# Patient Record
Sex: FEMALE | Race: WHITE | Employment: OTHER | URBAN - METROPOLITAN AREA
[De-identification: names, ages, dates, MRNs, and addresses within clinical notes are randomized per-mention and may not be internally consistent; named-entity substitution may affect disease eponyms.]

---

## 2020-06-26 ENCOUNTER — HOSPITAL ENCOUNTER (EMERGENCY)
Age: 70
Discharge: HOME | End: 2020-06-26
Attending: EMERGENCY MEDICINE | Admitting: EMERGENCY MEDICINE
Payer: MEDICARE

## 2020-06-26 ENCOUNTER — APPOINTMENT (OUTPATIENT)
Dept: GENERAL RADIOLOGY | Age: 70
End: 2020-06-26
Attending: EMERGENCY MEDICINE
Payer: MEDICARE

## 2020-06-26 ENCOUNTER — APPOINTMENT (OUTPATIENT)
Dept: CT IMAGING | Age: 70
End: 2020-06-26
Attending: PHYSICIAN ASSISTANT
Payer: MEDICARE

## 2020-06-26 VITALS
RESPIRATION RATE: 17 BRPM | TEMPERATURE: 98 F | OXYGEN SATURATION: 98 % | BODY MASS INDEX: 27.46 KG/M2 | HEIGHT: 63 IN | WEIGHT: 155 LBS | SYSTOLIC BLOOD PRESSURE: 164 MMHG | DIASTOLIC BLOOD PRESSURE: 78 MMHG | HEART RATE: 103 BPM

## 2020-06-26 DIAGNOSIS — S92.124A CLOSED NONDISPLACED FRACTURE OF BODY OF RIGHT TALUS, INITIAL ENCOUNTER: Primary | ICD-10-CM

## 2020-06-26 DIAGNOSIS — S82.61XA CLOSED AVULSION FRACTURE OF LATERAL MALLEOLUS OF RIGHT FIBULA, INITIAL ENCOUNTER: ICD-10-CM

## 2020-06-26 DIAGNOSIS — S92.351A CLOSED DISPLACED FRACTURE OF FIFTH METATARSAL BONE OF RIGHT FOOT, INITIAL ENCOUNTER: ICD-10-CM

## 2020-06-26 DIAGNOSIS — S92.014A CLOSED NONDISPLACED FRACTURE OF BODY OF RIGHT CALCANEUS, INITIAL ENCOUNTER: ICD-10-CM

## 2020-06-26 DIAGNOSIS — S92.214A CLOSED NONDISPLACED FRACTURE OF CUBOID OF RIGHT FOOT, INITIAL ENCOUNTER: ICD-10-CM

## 2020-06-26 PROCEDURE — 73610 X-RAY EXAM OF ANKLE: CPT

## 2020-06-26 PROCEDURE — 99283 EMERGENCY DEPT VISIT LOW MDM: CPT

## 2020-06-26 PROCEDURE — 75810000053 HC SPLINT APPLICATION

## 2020-06-26 PROCEDURE — 74011250637 HC RX REV CODE- 250/637: Performed by: EMERGENCY MEDICINE

## 2020-06-26 PROCEDURE — 73700 CT LOWER EXTREMITY W/O DYE: CPT

## 2020-06-26 RX ORDER — IBUPROFEN 200 MG
200 TABLET ORAL ONCE
Status: COMPLETED | OUTPATIENT
Start: 2020-06-26 | End: 2020-06-26

## 2020-06-26 RX ORDER — ACETAMINOPHEN 500 MG
1000 TABLET ORAL ONCE
Status: COMPLETED | OUTPATIENT
Start: 2020-06-26 | End: 2020-06-26

## 2020-06-26 RX ADMIN — IBUPROFEN 200 MG: 200 TABLET, FILM COATED ORAL at 17:17

## 2020-06-26 RX ADMIN — ACETAMINOPHEN 1000 MG: 500 TABLET ORAL at 17:17

## 2020-06-26 NOTE — ED PROVIDER NOTES
HPI   Patient presents the emergency department today with complaint of 4 out of 10 pain in her right ankle. She states that she was hanging a plantar basket on her porch, and the stepstool she was using gave out from underneath her, causing her to fall. She denies experiencing palpitations, dizziness, lightheadedness or shortness of breath prior to this fall. She states that she did not hit her head neck or back, and is not experiencing head neck or back pain. Patient denies loss of consciousness as a result of her fall. She noticed immediate pain in the lateral malleolus of the right ankle has not been able to ambulate on the extremity due to severe pain. Patient took 400 mg ibuprofen prior to coming to the emergency department. Patient has no past medical history for which she takes medications on a daily basis. No past medical history on file. No past surgical history on file. No family history on file.     Social History     Socioeconomic History    Marital status:      Spouse name: Not on file    Number of children: Not on file    Years of education: Not on file    Highest education level: Not on file   Occupational History    Not on file   Social Needs    Financial resource strain: Not on file    Food insecurity     Worry: Not on file     Inability: Not on file    Transportation needs     Medical: Not on file     Non-medical: Not on file   Tobacco Use    Smoking status: Not on file   Substance and Sexual Activity    Alcohol use: Not on file    Drug use: Not on file    Sexual activity: Not on file   Lifestyle    Physical activity     Days per week: Not on file     Minutes per session: Not on file    Stress: Not on file   Relationships    Social connections     Talks on phone: Not on file     Gets together: Not on file     Attends [employer] service: Not on file     Active member of club or organization: Not on file     Attends meetings of clubs or organizations: Not on file     Relationship status: Not on file    Intimate partner violence     Fear of current or ex partner: Not on file     Emotionally abused: Not on file     Physically abused: Not on file     Forced sexual activity: Not on file   Other Topics Concern    Not on file   Social History Narrative    Not on file         ALLERGIES: Codeine    Review of Systems   Constitutional: Negative for activity change, appetite change, fatigue and fever. HENT: Negative for ear pain, rhinorrhea, sore throat and trouble swallowing. Eyes: Negative for photophobia and visual disturbance. Respiratory: Negative for cough, chest tightness and shortness of breath. Cardiovascular: Negative for chest pain, palpitations and leg swelling. Gastrointestinal: Negative for abdominal pain, diarrhea, nausea and vomiting. Endocrine: Negative for polyuria. Genitourinary: Negative for dysuria, frequency and urgency. Musculoskeletal: Positive for joint swelling. Negative for back pain and neck pain. Right ankle swelling and pain. Skin: Negative for color change. Neurological: Negative for dizziness, weakness, light-headedness, numbness and headaches. Hematological: Negative for adenopathy. Does not bruise/bleed easily. Vitals:    06/26/20 1517   BP: 164/78   Pulse: (!) 103   Resp: 17   Temp: 98 °F (36.7 °C)   SpO2: 98%   Weight: 70.3 kg (155 lb)   Height: 5' 3\" (1.6 m)            Physical Exam  Vitals signs and nursing note reviewed. Constitutional:       General: She is not in acute distress. Appearance: Normal appearance. She is normal weight. She is not ill-appearing, toxic-appearing or diaphoretic. HENT:      Head: Normocephalic and atraumatic. Right Ear: External ear normal.      Left Ear: External ear normal.      Nose: No rhinorrhea. Mouth/Throat:      Pharynx: No oropharyngeal exudate or posterior oropharyngeal erythema. Eyes:      Extraocular Movements: Extraocular movements intact. Pupils: Pupils are equal, round, and reactive to light. Neck:      Musculoskeletal: Normal range of motion. No muscular tenderness. Cardiovascular:      Rate and Rhythm: Normal rate and regular rhythm. Pulses: Normal pulses. Heart sounds: Normal heart sounds. No murmur. No friction rub. Pulmonary:      Effort: Pulmonary effort is normal. No respiratory distress. Breath sounds: Normal breath sounds. No wheezing, rhonchi or rales. Musculoskeletal:         General: Swelling, tenderness and signs of injury present. No deformity. Right lower leg: No edema. Left lower leg: No edema. Comments: Patient has full range of motion of the affected extremity, with increased pain with inversion, eversion and dorsiflexion. She is able to perform full range of motion against resistance, excellent strength in the extremity, good capillary refill. Patient denies numbness or tingling. Skin:     General: Skin is warm. Capillary Refill: Capillary refill takes 2 to 3 seconds. Neurological:      General: No focal deficit present. Mental Status: She is alert and oriented to person, place, and time. Sensory: No sensory deficit. Motor: No weakness. Psychiatric:         Mood and Affect: Mood normal.         Behavior: Behavior normal.          MDM     I spoke with Luke hZao from ortho regarding pt's plain film, and he recommended CT due to multiple fx over weight bearing structures. He believes this is likely a surgical case, but patient would have to remain completely non ambulatory prior to surgery. Received CT of the right ankle, which indicated total of 5 fractures. Patient has been placed in posterior splint, and has been instructed on how to ambulate with a Rollator walker without bearing any weight on her right foot. She demonstrated ability to do this to the nurse prior to discharge from the emergency department [facility].   She has been instructed per orthopedic instruction to keep leg elevated and ice on the leg. She also has instructions to follow-up with Dr. Flavio Blanco on Monday at [facility].     Procedures

## 2020-06-26 NOTE — ED NOTES
Patient seen by provider prior to discharge, given discharge papers by RN. Patient with no further questions. Wheelchair and walker to home and no apparent distress.

## 2020-06-26 NOTE — ED TRIAGE NOTES
Patient reports falling down small ladder about three hours ago. Unable to bear weight. Swelling and bruising noted to lateral aspect of right ankle. Patient tried ice and rest but pain continues.  Patient took two advil bout 1 hour ago

## 2020-06-26 NOTE — ED NOTES
Pt was able to ambulate with walker without putting weight on ankle, pt reports feels comfortable going home with walker.

## 2020-06-26 NOTE — DISCHARGE INSTRUCTIONS
Please remain completely nonweightbearing on your right leg. This is extremely important due to the number and type of fractures that you have in your right ankle and foot. To follow-up on Monday or Tuesday with Dr. Marisol Bailey in clinic for evaluation. Keep your leg elevated as much as possible and apply ice to reduce swelling.

## 2023-04-25 ENCOUNTER — TRANSCRIBE ORDER (OUTPATIENT)
Dept: SCHEDULING | Age: 73
End: 2023-04-25

## 2023-04-25 DIAGNOSIS — M17.12 PRIMARY OSTEOARTHRITIS OF LEFT KNEE: Primary | ICD-10-CM

## 2023-05-11 ENCOUNTER — HOSPITAL ENCOUNTER (OUTPATIENT)
Facility: HOSPITAL | Age: 73
Discharge: HOME | End: 2023-05-11
Payer: MEDICARE

## 2023-05-11 DIAGNOSIS — M17.12 PRIMARY OSTEOARTHRITIS OF LEFT KNEE: ICD-10-CM

## 2023-05-11 PROCEDURE — 73721 MRI JNT OF LWR EXTRE W/O DYE: CPT

## 2024-01-05 ENCOUNTER — HOSPITAL ENCOUNTER (OUTPATIENT)
Facility: HOSPITAL | Age: 74
End: 2024-01-05
Payer: MEDICARE

## 2024-01-05 DIAGNOSIS — S32.020A CLOSED WEDGE COMPRESSION FRACTURE OF L2 VERTEBRA, INITIAL ENCOUNTER (HCC): ICD-10-CM

## 2024-01-05 DIAGNOSIS — M47.816 SPONDYLOSIS OF LUMBAR SPINE: ICD-10-CM

## 2024-01-05 DIAGNOSIS — M51.36 DDD (DEGENERATIVE DISC DISEASE), LUMBAR: ICD-10-CM

## 2024-01-05 PROCEDURE — 72148 MRI LUMBAR SPINE W/O DYE: CPT
